# Patient Record
Sex: MALE | URBAN - METROPOLITAN AREA
[De-identification: names, ages, dates, MRNs, and addresses within clinical notes are randomized per-mention and may not be internally consistent; named-entity substitution may affect disease eponyms.]

---

## 2021-10-23 ENCOUNTER — OFFICE VISIT (OUTPATIENT)
Dept: URBAN - METROPOLITAN AREA CLINIC 56 | Facility: CLINIC | Age: 76
End: 2021-10-23
Payer: MEDICARE

## 2021-10-23 DIAGNOSIS — H11.013 AMYLOID PTERYGIUM, BILATERAL: ICD-10-CM

## 2021-10-23 PROCEDURE — 99204 OFFICE O/P NEW MOD 45 MIN: CPT | Performed by: OPTOMETRIST

## 2021-10-23 PROCEDURE — 92134 CPTRZ OPH DX IMG PST SGM RTA: CPT | Performed by: OPTOMETRIST

## 2021-10-23 ASSESSMENT — INTRAOCULAR PRESSURE
OS: 17
OD: 17

## 2021-10-23 ASSESSMENT — KERATOMETRY
OD: 39.99
OS: 44.91

## 2021-10-23 ASSESSMENT — VISUAL ACUITY
OD: 20/25
OS: 20/20

## 2021-10-23 NOTE — IMPRESSION/PLAN
Impression: Amyloid pterygium, bilateral: H11.013. Plan: Educated patient on findings. Functionally significant and bothersome to patient. Will refer to Dr Marc Bautista for a consultation.  OD>OS

## 2021-12-16 ENCOUNTER — OFFICE VISIT (OUTPATIENT)
Dept: URBAN - METROPOLITAN AREA CLINIC 56 | Facility: CLINIC | Age: 76
End: 2021-12-16
Payer: MEDICARE

## 2021-12-16 DIAGNOSIS — H11.053 PERIPHERAL PTERYGIUM, PROGRESSIVE, BILATERAL: Primary | ICD-10-CM

## 2021-12-16 DIAGNOSIS — H25.13 AGE-RELATED NUCLEAR CATARACT, BILATERAL: ICD-10-CM

## 2021-12-16 DIAGNOSIS — H43.811 VITREOUS DEGENERATION, RIGHT EYE: ICD-10-CM

## 2021-12-16 PROCEDURE — 92025 CPTRIZED CORNEAL TOPOGRAPHY: CPT | Performed by: OPHTHALMOLOGY

## 2021-12-16 PROCEDURE — 99204 OFFICE O/P NEW MOD 45 MIN: CPT | Performed by: OPHTHALMOLOGY

## 2021-12-16 ASSESSMENT — VISUAL ACUITY
OD: 20/30
OS: 20/20

## 2021-12-16 ASSESSMENT — INTRAOCULAR PRESSURE
OS: 15
OD: 15

## 2021-12-16 ASSESSMENT — KERATOMETRY
OS: 44.94
OD: 43.85

## 2022-04-28 ENCOUNTER — OFFICE VISIT (OUTPATIENT)
Dept: URBAN - METROPOLITAN AREA CLINIC 56 | Facility: CLINIC | Age: 77
End: 2022-04-28
Payer: MEDICARE

## 2022-04-28 DIAGNOSIS — H25.13 AGE-RELATED NUCLEAR CATARACT, BILATERAL: ICD-10-CM

## 2022-04-28 DIAGNOSIS — H11.051 PERIPHERAL PTERYGIUM, PROGRESSIVE, RIGHT EYE: Primary | ICD-10-CM

## 2022-04-28 PROCEDURE — 99213 OFFICE O/P EST LOW 20 MIN: CPT | Performed by: OPHTHALMOLOGY

## 2022-04-28 ASSESSMENT — INTRAOCULAR PRESSURE
OD: 14
OS: 14

## 2022-04-28 NOTE — IMPRESSION/PLAN
Impression: Peripheral pterygium, progressive, right eye: H11.051. Plan: Discussed risks, benefits and alternatives to the patient. Warned patient even with Pterygium surgery there is still a chance the growth can return. Patient warned the need to continue to  wear sunglasses to protect against  ultraviolet light along with irritants like dust and wind. May need to take steroid eye  drops for several weeks following surgery along with ointment and artificial tears. Patient elects at this time for  surgical treatment, Pterygium surgery with grafting .

## 2022-05-06 ENCOUNTER — ADULT PHYSICAL (OUTPATIENT)
Dept: URBAN - METROPOLITAN AREA CLINIC 56 | Facility: CLINIC | Age: 77
End: 2022-05-06
Payer: MEDICARE

## 2022-05-06 DIAGNOSIS — Z01.818 ENCOUNTER FOR OTHER PREPROCEDURAL EXAMINATION: Primary | ICD-10-CM

## 2022-05-06 PROCEDURE — 99203 OFFICE O/P NEW LOW 30 MIN: CPT | Performed by: PHYSICIAN ASSISTANT

## 2022-05-16 ENCOUNTER — SURGERY (OUTPATIENT)
Dept: URBAN - METROPOLITAN AREA SURGERY 5 | Facility: SURGERY | Age: 77
End: 2022-05-16
Payer: MEDICARE

## 2022-05-16 PROCEDURE — 65426 REMOVAL OF EYE LESION: CPT | Performed by: OPHTHALMOLOGY

## 2022-05-16 RX ORDER — PREDNISOLONE ACETATE 10 MG/ML
1 % SUSPENSION/ DROPS OPHTHALMIC
Qty: 5 | Refills: 1 | Status: INACTIVE
Start: 2022-05-16 | End: 2022-05-16

## 2022-05-16 RX ORDER — OFLOXACIN 3 MG/ML
0.3 % SOLUTION/ DROPS OPHTHALMIC
Qty: 5 | Refills: 1 | Status: ACTIVE
Start: 2022-05-16

## 2022-05-16 RX ORDER — ERYTHROMYCIN 5 MG/G
OINTMENT OPHTHALMIC
Qty: 3 | Refills: 1 | Status: ACTIVE
Start: 2022-05-16

## 2022-05-17 ENCOUNTER — POST-OPERATIVE VISIT (OUTPATIENT)
Dept: URBAN - METROPOLITAN AREA CLINIC 56 | Facility: CLINIC | Age: 77
End: 2022-05-17
Payer: MEDICARE

## 2022-05-17 PROCEDURE — 99024 POSTOP FOLLOW-UP VISIT: CPT | Performed by: STUDENT IN AN ORGANIZED HEALTH CARE EDUCATION/TRAINING PROGRAM

## 2022-05-17 ASSESSMENT — INTRAOCULAR PRESSURE: OD: 18

## 2022-05-17 NOTE — IMPRESSION/PLAN
Impression: S/P Pterygium Excision with TIsseel and Amniograft OD - 1 Day. Encounter for surgical aftercare following surgery on a sense organ  Z48.810. Plan: good IOP. Restrictions discussed. Call with worsening pain or vision.

## 2022-05-26 ENCOUNTER — POST-OPERATIVE VISIT (OUTPATIENT)
Dept: URBAN - METROPOLITAN AREA CLINIC 56 | Facility: CLINIC | Age: 77
End: 2022-05-26

## 2022-05-26 PROCEDURE — 99024 POSTOP FOLLOW-UP VISIT: CPT | Performed by: OPHTHALMOLOGY

## 2022-05-26 RX ORDER — ACYCLOVIR 400 MG/1
400 MG TABLET ORAL
Qty: 35 | Refills: 0 | Status: INACTIVE
Start: 2022-05-26 | End: 2022-06-02

## 2022-05-26 ASSESSMENT — VISUAL ACUITY
OS: 20/20
OD: 20/20

## 2022-05-26 ASSESSMENT — INTRAOCULAR PRESSURE
OD: 18
OS: 18

## 2022-05-26 NOTE — IMPRESSION/PLAN
Impression: S/P Pterygium Excision with TIsseel and Amniograft OD - 10 Days. Encounter for surgical aftercare following surgery on a sense organ  Z48.810. Plan: dendrite noted today will stop prednisolone and add ACY 400mg 5x a day, also continue Oculfox 5 x a day OD, erythomycin 5 x a day OD. Return in 1 day with Optometry.  Oculfox 5 x a day OD, erythomycin 5 x a day OD

## 2022-05-27 ENCOUNTER — POST-OPERATIVE VISIT (OUTPATIENT)
Dept: URBAN - METROPOLITAN AREA CLINIC 56 | Facility: CLINIC | Age: 77
End: 2022-05-27
Payer: MEDICARE

## 2022-05-27 DIAGNOSIS — Z48.810 ENCOUNTER FOR SURGICAL AFTERCARE FOLLOWING SURGERY ON A SENSE ORGAN: Primary | ICD-10-CM

## 2022-05-27 PROCEDURE — 99024 POSTOP FOLLOW-UP VISIT: CPT | Performed by: STUDENT IN AN ORGANIZED HEALTH CARE EDUCATION/TRAINING PROGRAM

## 2022-05-27 ASSESSMENT — INTRAOCULAR PRESSURE
OS: 16
OD: 16

## 2022-05-27 NOTE — IMPRESSION/PLAN
Impression: S/P Pterygium Excision with TIsseel and Amniograft OD - 11 Days. Encounter for surgical aftercare following surgery on a sense organ  Z48.810. Plan: with dendrite x 1 day. Improving appearance and symptoms. No changes made to treatment. Cont acyclovir 400mg 5x/day (called in 1 week supply) Cont ofloxacin QID Cont erythromycin dylan 

F/u next Thurs. Call if pain or vision worsen.

## 2022-06-02 ENCOUNTER — POST-OPERATIVE VISIT (OUTPATIENT)
Dept: URBAN - METROPOLITAN AREA CLINIC 56 | Facility: CLINIC | Age: 77
End: 2022-06-02
Payer: MEDICARE

## 2022-06-02 DIAGNOSIS — Z48.810 ENCOUNTER FOR SURGICAL AFTERCARE FOLLOWING SURGERY ON A SENSE ORGAN: Primary | ICD-10-CM

## 2022-06-02 PROCEDURE — 99024 POSTOP FOLLOW-UP VISIT: CPT | Performed by: STUDENT IN AN ORGANIZED HEALTH CARE EDUCATION/TRAINING PROGRAM

## 2022-06-02 RX ORDER — ACYCLOVIR 400 MG/1
400 MG TABLET ORAL
Qty: 35 | Refills: 0 | Status: ACTIVE
Start: 2022-06-02

## 2022-06-02 ASSESSMENT — INTRAOCULAR PRESSURE
OS: 16
OD: 20

## 2022-06-02 NOTE — IMPRESSION/PLAN
Impression: S/P Pterygium Excision with TIsseel and Amniograft OD - 17 Days. Encounter for surgical aftercare following surgery on a sense organ  Z48.810. Plan: s/p pterygium exc - dendrite resolved. Continue acyclovir 400mg 5x/day x 1 more week then d/c Cont erythromycin dylan and ofloxacin. RTC with pongratz in 2 weeks.  If resolved, ok to f/u for CE 12/2022

## 2022-07-21 ENCOUNTER — POST-OPERATIVE VISIT (OUTPATIENT)
Dept: URBAN - METROPOLITAN AREA CLINIC 56 | Facility: CLINIC | Age: 77
End: 2022-07-21

## 2022-07-21 DIAGNOSIS — H11.013 AMYLOID PTERYGIUM, BILATERAL: Primary | ICD-10-CM

## 2022-07-21 PROCEDURE — 99024 POSTOP FOLLOW-UP VISIT: CPT | Performed by: OPHTHALMOLOGY

## 2022-07-21 ASSESSMENT — INTRAOCULAR PRESSURE
OS: 13
OD: 19

## 2022-07-21 NOTE — IMPRESSION/PLAN
Impression: Amyloid pterygium, bilateral: H11.013. Plan: Discussed Pterygium with patient. Will continue to monitor at this time BOTH EYES per patient request today through  . Patient warned of the need to continue to  wear sunglasses to protect against ultraviolet light along with irritants like dust and wind.  Use Artificial tears ,  Q ID

## 2024-11-01 NOTE — IMPRESSION/PLAN
Impression: Age-related nuclear cataract, bilateral: H25.13. Plan: Not visually significant. Observe until vision decreases.  Will re-evaluate at the next annual comprehensive eye exam.
Impression: Peripheral pterygium, progressive, bilateral: H11.053. Plan: Discussed risks, benefits and alternatives to the patient. Warned patient even with pterygium surgery there is still a chance the growth can return. Patient warned the need to continue to  wear sunglasses to protect against  ultraviolet light along with irritants like dust and wind. May need to take steroid eyedrops for several weeks following surgery along with ointment and artificial tears. Patient elects at this time for  surgical treatment, pterygium surgery with grafting . Schedule Right eye
Impression: Vitreous degeneration, right eye: H43.811. Plan: There is no evidence of retinal pathology. All signs and risks of retinal detachment and tears were discussed in detail. Patient instructed to call the office immediately if any symptoms noted.
Yes